# Patient Record
Sex: FEMALE | Race: WHITE
[De-identification: names, ages, dates, MRNs, and addresses within clinical notes are randomized per-mention and may not be internally consistent; named-entity substitution may affect disease eponyms.]

---

## 2017-08-13 ENCOUNTER — HOSPITAL ENCOUNTER (INPATIENT)
Dept: HOSPITAL 23 - P1S | Age: 27
LOS: 5 days | Discharge: HOME | DRG: 885 | End: 2017-08-18
Attending: SPECIALIST | Admitting: SPECIALIST
Payer: COMMERCIAL

## 2017-08-13 VITALS — WEIGHT: 178 LBS | BODY MASS INDEX: 31.54 KG/M2 | HEIGHT: 63 IN

## 2017-08-13 DIAGNOSIS — F15.20: ICD-10-CM

## 2017-08-13 DIAGNOSIS — F11.20: ICD-10-CM

## 2017-08-13 DIAGNOSIS — Z88.0: ICD-10-CM

## 2017-08-13 DIAGNOSIS — Z88.2: ICD-10-CM

## 2017-08-13 DIAGNOSIS — F13.20: ICD-10-CM

## 2017-08-13 DIAGNOSIS — R45.851: ICD-10-CM

## 2017-08-13 DIAGNOSIS — F17.210: ICD-10-CM

## 2017-08-13 DIAGNOSIS — F33.1: Primary | ICD-10-CM

## 2017-08-14 LAB
BARBITURATES UR QL SCN: 0.6 MG/DL
BARBITURATES UR QL SCN: 4.1 G/DL
BASOPHIL#: 0 X10E3
BASOPHIL%: 0.3 %
BENZODIAZ UR QL SCN: 10 U/L
BENZODIAZ UR QL SCN: 13 U/L
BLOOD UREA NITROGEN: 15 MG/DL
BUN/CREATININE RATIO: 21.42
BZE UR QL SCN: 42 U/L
CALCIUM SERUM: 9.3 MG/DL
CK MB SERPL-RTO: 12.8 %
CK MB SERPL-RTO: 33.9 G/DL
CREATININE SERUM: 0.7 MG/DL
DIFF IND: YES
EOSINOPHIL#: 0.9 X10E3
EOSINOPHIL%: 10.6 %
EOSINOPHIL: 7 %
GLOM FILT RATE ESTIMATED: 120.4 ML/MIN
GLUCOSE FASTING: 82 MG/DL
HEMATOCRIT: 41.7 %
HEMOGLOBIN: 14.2 GM/DL
KETONES UR QL: 103 MMOL/L
KETONES UR QL: 28 MMOL/L
LYMPHOCYTE#: 4.4 X10E3
LYMPHOCYTE%: 50.2 %
LYMPHOCYTE: 50 %
MEAN CELL VOLUME: 89.2 FL
MEAN CORPUSCULAR HEMOGLOBIN: 30.3 PG
MEAN PLATELET VOLUME: 7.5 FL
MONOCYTE#: 0.7 X10E3
MONOCYTE%: 7.5 %
MONOCYTE: 8 %
NEUTROPHIL#: 2.7 X10E3
NEUTROPHIL%: 31.4 %
NEUTROPHIL: 35 %
PLATELET COUNT: 317 X10E3
PLATELET ESTIMATE: NORMAL
POTASSIUM: 4.1 MMOL/L
PROTEIN TOTAL SERUM: 6.8 G/DL
RED BLOOD COUNT: 4.68 X10E
SODIUM: 140 MMOL/L
WHITE BLOOD COUNT: 8.8 X10E3

## 2024-03-13 ENCOUNTER — TELEPHONE (OUTPATIENT)
Dept: SPORTS MEDICINE | Facility: CLINIC | Age: 34
End: 2024-03-13

## 2024-03-13 ENCOUNTER — OFFICE VISIT (OUTPATIENT)
Age: 34
End: 2024-03-13
Payer: COMMERCIAL

## 2024-03-13 VITALS — WEIGHT: 201.1 LBS | TEMPERATURE: 97.7 F | BODY MASS INDEX: 34.33 KG/M2 | HEIGHT: 64 IN

## 2024-03-13 DIAGNOSIS — R52 PAIN: Primary | ICD-10-CM

## 2024-03-13 DIAGNOSIS — S62.326B: ICD-10-CM

## 2024-03-13 RX ORDER — LAMOTRIGINE 100 MG/1
100 TABLET ORAL NIGHTLY
COMMUNITY
Start: 2024-03-06

## 2024-03-13 RX ORDER — CLINDAMYCIN HYDROCHLORIDE 300 MG/1
300 CAPSULE ORAL 4 TIMES DAILY
Qty: 12 CAPSULE | Refills: 0 | Status: SHIPPED | OUTPATIENT
Start: 2024-03-13

## 2024-03-13 RX ORDER — FLUTICASONE PROPIONATE AND SALMETEROL XINAFOATE 115; 21 UG/1; UG/1
AEROSOL, METERED RESPIRATORY (INHALATION)
COMMUNITY

## 2024-03-13 RX ORDER — BUSPIRONE HYDROCHLORIDE 10 MG/1
10 TABLET ORAL 2 TIMES DAILY
COMMUNITY

## 2024-03-13 RX ORDER — BUPROPION HYDROCHLORIDE 300 MG/1
300 TABLET ORAL EVERY MORNING
COMMUNITY
Start: 2024-02-12

## 2024-03-13 RX ORDER — HYDROCODONE BITARTRATE AND ACETAMINOPHEN 5; 325 MG/1; MG/1
1 TABLET ORAL
COMMUNITY
Start: 2024-03-11

## 2024-03-13 RX ORDER — MONTELUKAST SODIUM 10 MG/1
10 TABLET ORAL
COMMUNITY
Start: 2024-03-02

## 2024-03-13 RX ORDER — HYDROXYZINE HYDROCHLORIDE 25 MG/1
TABLET, FILM COATED ORAL
COMMUNITY

## 2024-03-13 RX ORDER — FAMOTIDINE 20 MG/1
20 TABLET, FILM COATED ORAL 2 TIMES DAILY
COMMUNITY

## 2024-03-13 RX ORDER — CETIRIZINE HYDROCHLORIDE 10 MG/1
10 TABLET ORAL 2 TIMES DAILY
COMMUNITY
Start: 2024-02-07

## 2024-03-13 NOTE — PROGRESS NOTES
Chief Complaint   Patient presents with    Right Hand - Pain, Initial Evaluation       History of Present Illness  Chayito is a 33 y.o. year old RHD female here today for right hand pain that has been present since 3/10/24 after she punched a steel table at work. She was seen at [facility] where x-rays showed a fracture of the 5th metacarpal. She was placed in a splint and instructed to follow-up with ortho.  Pain has improved since initial injury. She denies pain at rest, and states that she only has pain with movement.  Pain is located on the ulnar aspect of her hand, but will shoot up her forearm with movement.  Admits to associated swelling of the 4th and 5th fingers  Also reports numbness of the pinky  Pain worsens with any sort of movement.   Has been managing symptoms with rest, elevation, and hydrocodone which she only takes at night because it knocks her out. Has otherwise been using ibuprofen as needed.   Denies any previous injury or occurrence.     Works at [employer].      The following data was reviewed by: Sophie Jones DO on 03/13/2024:  Data reviewed :        XR hand 3+ views right from 3/11/24    Anatomical Region Laterality Modality   Upper Extremities right Radiographic Imaging   Hand -- --     Narrative    INDICATION:  Right fifth digit pain post punching steel plate.    TECHNIQUE:  Three views of the right hand.    COMPARISON:  None Available.    FINDINGS:    There is an oblique angulated fracture of the mid and distal shaft of the right  fifth metacarpal.  Overlying soft tissue swelling.  No underlying lytic or blastic  lesion.  No dislocation.  The fracture does not appear intra-articular.  The  fingers are held in mild flexion.  Allowing for this limitation, there is no  fracture of the phalanges.    IMPRESSION:  Oblique angulated fracture of the mid and distal shaft, right fifth metacarpal.    Signed by William Patrick MD  ##### Final #####      Lab Results   Component Value Date    GLUCOSE 85  "06/10/2017    BUN 11 07/20/2021    CREATININE 0.57 07/20/2021    EGFR >60 09/23/2014    BCR 19.3 07/20/2021    K 4.4 07/20/2021    CO2 25 07/20/2021    CALCIUM 9.4 07/20/2021    ALBUMIN 3.8 07/20/2021    BILITOT 0.3 07/20/2021    AST 20 07/20/2021    ALT 14 07/20/2021         Temp 97.7 °F (36.5 °C)   Ht 162.6 cm (64\")   Wt 91.2 kg (201 lb 1.6 oz)   BMI 34.52 kg/m²        Physical Exam  Vital signs reviewed.   General: Well developed, well nourished; No acute distress.  Eyes: conjunctiva clear; pupils equally round and reactive  ENT: external ears and nose atraumatic; hearing normal  CV: no peripheral edema, 2+ distal pulses  Resp: normal respiratory effort, no use of accessory muscles  Skin: normal color and pigmentation; no rashes or wounds; normal turgor  Psych: alert and oriented; mood and affect appropriate; recent and remote memory intact    MSK Exam:  The right hand is diffusely swollen throughout, worse along the medial aspect, but with swelling that continues to all digits. There is associated ecchymosis over the 5th metacarpal. There is what appears to be a pinpoint break in the skin on the dorsum of the hand that correlates with the site of the fracture. The pinpoint break in the skin is covered with a scab, and is clean and dry with no drainage and no surrounding erythema. There are obvious signs of acute bony deformity with loss of prominence of the 5th MCP joint and subsequent slight volar displacement of the digit. There is decreased sensation of the pinky finger. Digit is warm and well perfused and there is normal cap refill. ROM and strength testing deferred given the presence of a known and significant fracture. There is mild tenderness at the 4th metacarpal but no focal area of significant tenderness. The opposite arm is normal.    Right Hand X-Ray  Indication: Pain  AP, Lateral, and Oblique views  Findings:  Oblique and angulated fracture at the mid to distal shaft of the 5th metacarpal with " possible comminution and extension proximally  There is significant volar angulation which has resulted in a thin, sharp end to the proximal fragment that appears to be very superficial to the skin surface  No bony lesion  Soft tissue swelling  No prior images were available for direct comparison.      Assessment and Plan  Diagnoses and all orders for this visit:    1. Pain (Primary)  -     XR Hand 3+ View Right    2. Displaced fracture of shaft of fifth metacarpal bone, right hand, initial encounter for open fracture  -     Ambulatory Referral to Hand Surgery  -     clindamycin (CLEOCIN) 300 MG capsule; Take 1 capsule by mouth 4 (Four) Times a Day.  Dispense: 12 capsule; Refill: 0    Chayito is a 33 y.o. year old RHD female here today for right hand pain that has been present since 3/10/24 after she punched a steel table at work. We reviewed images and exam findings. X-rays show an oblique fracture at the mid to distal shaft of the 5th metacarpal with possible comminution. The fracture and significant volar angulation of the distal segment has resulted in a thin, sharp end to the proximal segment that appears to be very superficial to the skin surface. Furthermore, there is a pinpoint wound on the dorsum of the hand that is concerning for an open fracture. I recommended that she be seen by a hand surgeon and an urgent referral was placed. She was placed on a short course of oral antibiotics to help prevent infection. (Unfortunately, she has an allergy to penicillin so she was placed on clindamycin.) She was placed in a new ulnar gutter splint with strict instructions that it should be worn continuously until she is seen by the surgeon. She may continue with use of the previously prescribed Norco as needed. May also use OTC NSAIDs and/or Tylenol as needed. (We reviewed proper dosing recommendations, including caution not exceed recommended daily dose of acetaminophen as that is also in her Norco.) I recommended  continued elevation above the level of her heart. We will follow-up as needed. All of her questions were answered and she is agreeable with the plan.    Dictated utilizing Dragon dictation.

## 2024-03-13 NOTE — TELEPHONE ENCOUNTER
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

## 2024-03-13 NOTE — LETTER
March 13, 2024     Patient: Chayito Rose   YOB: 1990   Date of Visit: 3/13/2024       To Whom It May Concern:    It is my medical opinion that Chayito Rose may return to light duty on 3/14/24 with the following restrictions: she should avoid use of the right upper extremity. 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 She will follow-up with a specialist in the upcoming week and updated recommendations will be made at that time.           Sincerely,        Sophie Jones DO    CC: No Recipients

## 2024-03-15 ENCOUNTER — TRANSCRIBE ORDERS (OUTPATIENT)
Dept: SURGERY | Facility: SURGERY CENTER | Age: 34
End: 2024-03-15
Payer: COMMERCIAL

## 2024-03-15 DIAGNOSIS — Z41.9 SURGERY, ELECTIVE: Primary | ICD-10-CM

## 2024-03-20 ENCOUNTER — HOSPITAL ENCOUNTER (OUTPATIENT)
Facility: SURGERY CENTER | Age: 34
Setting detail: HOSPITAL OUTPATIENT SURGERY
Discharge: HOME | End: 2024-03-20
Attending: STUDENT IN AN ORGANIZED HEALTH CARE EDUCATION/TRAINING PROGRAM | Admitting: STUDENT IN AN ORGANIZED HEALTH CARE EDUCATION/TRAINING PROGRAM
Payer: COMMERCIAL

## 2024-03-20 ENCOUNTER — ANESTHESIA (OUTPATIENT)
Dept: SURGERY | Facility: SURGERY CENTER | Age: 34
End: 2024-03-20
Payer: COMMERCIAL

## 2024-03-20 ENCOUNTER — ANESTHESIA EVENT (OUTPATIENT)
Dept: SURGERY | Facility: SURGERY CENTER | Age: 34
End: 2024-03-20
Payer: COMMERCIAL

## 2024-03-20 VITALS
OXYGEN SATURATION: 98 % | RESPIRATION RATE: 16 BRPM | WEIGHT: 203.4 LBS | BODY MASS INDEX: 34.72 KG/M2 | DIASTOLIC BLOOD PRESSURE: 68 MMHG | HEIGHT: 64 IN | HEART RATE: 80 BPM | TEMPERATURE: 98.4 F | SYSTOLIC BLOOD PRESSURE: 120 MMHG

## 2024-03-20 LAB
B-HCG UR QL: NEGATIVE
EXPIRATION DATE: NORMAL
INTERNAL NEGATIVE CONTROL: NEGATIVE
INTERNAL POSITIVE CONTROL: POSITIVE
Lab: NORMAL

## 2024-03-20 PROCEDURE — 25010000002 PROPOFOL 1000 MG/100ML EMULSION: Performed by: ANESTHESIOLOGY

## 2024-03-20 PROCEDURE — 81025 URINE PREGNANCY TEST: CPT | Performed by: STUDENT IN AN ORGANIZED HEALTH CARE EDUCATION/TRAINING PROGRAM

## 2024-03-20 PROCEDURE — 26615 TREAT METACARPAL FRACTURE: CPT | Performed by: STUDENT IN AN ORGANIZED HEALTH CARE EDUCATION/TRAINING PROGRAM

## 2024-03-20 PROCEDURE — 25010000002 DEXAMETHASONE PER 1 MG: Performed by: ANESTHESIOLOGY

## 2024-03-20 PROCEDURE — 25010000002 LIDOCAINE 1 % SOLUTION: Performed by: ANESTHESIOLOGY

## 2024-03-20 PROCEDURE — 25010000002 CLINDAMYCIN 300 MG/50ML SOLUTION: Performed by: ANESTHESIOLOGY

## 2024-03-20 PROCEDURE — 25010000002 HYDROMORPHONE 1 MG/ML SOLUTION: Performed by: ANESTHESIOLOGY

## 2024-03-20 PROCEDURE — 25810000003 LACTATED RINGERS PER 1000 ML: Performed by: ANESTHESIOLOGY

## 2024-03-20 PROCEDURE — 25010000002 MIDAZOLAM PER 1 MG: Performed by: ANESTHESIOLOGY

## 2024-03-20 PROCEDURE — 25010000002 FENTANYL CITRATE (PF) 50 MCG/ML SOLUTION: Performed by: ANESTHESIOLOGY

## 2024-03-20 PROCEDURE — 25010000002 DROPERIDOL PER 5 MG: Performed by: ANESTHESIOLOGY

## 2024-03-20 RX ORDER — MAGNESIUM HYDROXIDE 1200 MG/15ML
LIQUID ORAL AS NEEDED
Status: DISCONTINUED | OUTPATIENT
Start: 2024-03-20 | End: 2024-03-20 | Stop reason: HOSPADM

## 2024-03-20 RX ORDER — PROMETHAZINE HYDROCHLORIDE 12.5 MG/1
25 TABLET ORAL ONCE AS NEEDED
Status: DISCONTINUED | OUTPATIENT
Start: 2024-03-20 | End: 2024-03-20 | Stop reason: HOSPADM

## 2024-03-20 RX ORDER — FLUMAZENIL 0.1 MG/ML
0.2 INJECTION INTRAVENOUS AS NEEDED
Status: DISCONTINUED | OUTPATIENT
Start: 2024-03-20 | End: 2024-03-20 | Stop reason: HOSPADM

## 2024-03-20 RX ORDER — PROPOFOL 10 MG/ML
INJECTION, EMULSION INTRAVENOUS AS NEEDED
Status: DISCONTINUED | OUTPATIENT
Start: 2024-03-20 | End: 2024-03-20 | Stop reason: SURG

## 2024-03-20 RX ORDER — DROPERIDOL 2.5 MG/ML
0.62 INJECTION, SOLUTION INTRAMUSCULAR; INTRAVENOUS
Status: DISCONTINUED | OUTPATIENT
Start: 2024-03-20 | End: 2024-03-20 | Stop reason: HOSPADM

## 2024-03-20 RX ORDER — FENTANYL CITRATE 50 UG/ML
50 INJECTION, SOLUTION INTRAMUSCULAR; INTRAVENOUS ONCE AS NEEDED
Status: DISCONTINUED | OUTPATIENT
Start: 2024-03-20 | End: 2024-03-20 | Stop reason: HOSPADM

## 2024-03-20 RX ORDER — CLINDAMYCIN PHOSPHATE 900 MG/50ML
900 INJECTION, SOLUTION INTRAVENOUS ONCE
Status: DISCONTINUED | OUTPATIENT
Start: 2024-03-20 | End: 2024-03-20 | Stop reason: HOSPADM

## 2024-03-20 RX ORDER — SODIUM CHLORIDE 0.9 % (FLUSH) 0.9 %
3 SYRINGE (ML) INJECTION EVERY 12 HOURS SCHEDULED
Status: DISCONTINUED | OUTPATIENT
Start: 2024-03-20 | End: 2024-03-20 | Stop reason: HOSPADM

## 2024-03-20 RX ORDER — FAMOTIDINE 10 MG/ML
20 INJECTION, SOLUTION INTRAVENOUS ONCE
Status: COMPLETED | OUTPATIENT
Start: 2024-03-20 | End: 2024-03-20

## 2024-03-20 RX ORDER — NALOXONE HCL 0.4 MG/ML
0.2 VIAL (ML) INJECTION AS NEEDED
Status: DISCONTINUED | OUTPATIENT
Start: 2024-03-20 | End: 2024-03-20 | Stop reason: HOSPADM

## 2024-03-20 RX ORDER — MIDAZOLAM HYDROCHLORIDE 1 MG/ML
1 INJECTION INTRAMUSCULAR; INTRAVENOUS
Status: DISCONTINUED | OUTPATIENT
Start: 2024-03-20 | End: 2024-03-20 | Stop reason: HOSPADM

## 2024-03-20 RX ORDER — FENTANYL CITRATE 0.05 MG/ML
INJECTION, SOLUTION INTRAMUSCULAR; INTRAVENOUS AS NEEDED
Status: DISCONTINUED | OUTPATIENT
Start: 2024-03-20 | End: 2024-03-20 | Stop reason: SURG

## 2024-03-20 RX ORDER — NALOXONE HYDROCHLORIDE 4 MG/.1ML
SPRAY NASAL
Qty: 2 EACH | Refills: 0 | Status: SHIPPED | OUTPATIENT
Start: 2024-03-20

## 2024-03-20 RX ORDER — CLINDAMYCIN PHOSPHATE 300 MG/50ML
INJECTION, SOLUTION INTRAVENOUS AS NEEDED
Status: DISCONTINUED | OUTPATIENT
Start: 2024-03-20 | End: 2024-03-20 | Stop reason: SURG

## 2024-03-20 RX ORDER — ONDANSETRON 2 MG/ML
4 INJECTION INTRAMUSCULAR; INTRAVENOUS ONCE AS NEEDED
Status: DISCONTINUED | OUTPATIENT
Start: 2024-03-20 | End: 2024-03-20 | Stop reason: HOSPADM

## 2024-03-20 RX ORDER — FENTANYL CITRATE 50 UG/ML
50 INJECTION, SOLUTION INTRAMUSCULAR; INTRAVENOUS
Status: DISCONTINUED | OUTPATIENT
Start: 2024-03-20 | End: 2024-03-20 | Stop reason: HOSPADM

## 2024-03-20 RX ORDER — BUPIVACAINE HYDROCHLORIDE AND EPINEPHRINE 2.5; 5 MG/ML; UG/ML
INJECTION, SOLUTION EPIDURAL; INFILTRATION; INTRACAUDAL; PERINEURAL AS NEEDED
Status: DISCONTINUED | OUTPATIENT
Start: 2024-03-20 | End: 2024-03-20 | Stop reason: HOSPADM

## 2024-03-20 RX ORDER — HYDROCODONE BITARTRATE AND ACETAMINOPHEN 5; 325 MG/1; MG/1
1 TABLET ORAL EVERY 6 HOURS PRN
Qty: 5 TABLET | Refills: 0 | Status: SHIPPED | OUTPATIENT
Start: 2024-03-20

## 2024-03-20 RX ORDER — SODIUM CHLORIDE, SODIUM LACTATE, POTASSIUM CHLORIDE, CALCIUM CHLORIDE 600; 310; 30; 20 MG/100ML; MG/100ML; MG/100ML; MG/100ML
9 INJECTION, SOLUTION INTRAVENOUS CONTINUOUS
Status: DISCONTINUED | OUTPATIENT
Start: 2024-03-20 | End: 2024-03-20 | Stop reason: HOSPADM

## 2024-03-20 RX ORDER — HYDROCODONE BITARTRATE AND ACETAMINOPHEN 5; 325 MG/1; MG/1
1 TABLET ORAL EVERY 6 HOURS PRN
Status: DISCONTINUED | OUTPATIENT
Start: 2024-03-20 | End: 2024-03-20 | Stop reason: HOSPADM

## 2024-03-20 RX ORDER — PROMETHAZINE HYDROCHLORIDE 25 MG/1
25 SUPPOSITORY RECTAL ONCE AS NEEDED
Status: DISCONTINUED | OUTPATIENT
Start: 2024-03-20 | End: 2024-03-20 | Stop reason: HOSPADM

## 2024-03-20 RX ORDER — SODIUM CHLORIDE 0.9 % (FLUSH) 0.9 %
3-10 SYRINGE (ML) INJECTION AS NEEDED
Status: DISCONTINUED | OUTPATIENT
Start: 2024-03-20 | End: 2024-03-20 | Stop reason: HOSPADM

## 2024-03-20 RX ORDER — DIPHENHYDRAMINE HYDROCHLORIDE 50 MG/ML
12.5 INJECTION INTRAMUSCULAR; INTRAVENOUS
Status: DISCONTINUED | OUTPATIENT
Start: 2024-03-20 | End: 2024-03-20 | Stop reason: HOSPADM

## 2024-03-20 RX ORDER — LIDOCAINE HYDROCHLORIDE 10 MG/ML
INJECTION, SOLUTION INFILTRATION; PERINEURAL AS NEEDED
Status: DISCONTINUED | OUTPATIENT
Start: 2024-03-20 | End: 2024-03-20 | Stop reason: SURG

## 2024-03-20 RX ORDER — DEXAMETHASONE SODIUM PHOSPHATE 4 MG/ML
INJECTION, SOLUTION INTRA-ARTICULAR; INTRALESIONAL; INTRAMUSCULAR; INTRAVENOUS; SOFT TISSUE AS NEEDED
Status: DISCONTINUED | OUTPATIENT
Start: 2024-03-20 | End: 2024-03-20 | Stop reason: SURG

## 2024-03-20 RX ADMIN — DROPERIDOL 1.25 MG: 2.5 INJECTION, SOLUTION INTRAMUSCULAR; INTRAVENOUS at 10:36

## 2024-03-20 RX ADMIN — SODIUM CHLORIDE, POTASSIUM CHLORIDE, SODIUM LACTATE AND CALCIUM CHLORIDE 9 ML/HR: 600; 310; 30; 20 INJECTION, SOLUTION INTRAVENOUS at 08:53

## 2024-03-20 RX ADMIN — FAMOTIDINE 20 MG: 10 INJECTION, SOLUTION INTRAVENOUS at 08:53

## 2024-03-20 RX ADMIN — DEXAMETHASONE SODIUM PHOSPHATE 8 MG: 4 INJECTION, SOLUTION INTRAMUSCULAR; INTRAVENOUS at 10:42

## 2024-03-20 RX ADMIN — PROPOFOL 50 MG: 10 INJECTION, EMULSION INTRAVENOUS at 10:39

## 2024-03-20 RX ADMIN — LIDOCAINE HYDROCHLORIDE 60 MG: 10 INJECTION, SOLUTION INFILTRATION; PERINEURAL at 10:38

## 2024-03-20 RX ADMIN — FENTANYL CITRATE 50 MCG: 0.05 INJECTION, SOLUTION INTRAMUSCULAR; INTRAVENOUS at 10:36

## 2024-03-20 RX ADMIN — PROPOFOL 150 MG: 10 INJECTION, EMULSION INTRAVENOUS at 10:38

## 2024-03-20 RX ADMIN — MIDAZOLAM 1 MG: 1 INJECTION INTRAMUSCULAR; INTRAVENOUS at 08:54

## 2024-03-20 RX ADMIN — PROPOFOL 50 MG: 10 INJECTION, EMULSION INTRAVENOUS at 10:40

## 2024-03-20 RX ADMIN — CLINDAMYCIN PHOSPHATE 900 MG: 300 INJECTION, SOLUTION INTRAVENOUS at 10:45

## 2024-03-20 RX ADMIN — HYDROCODONE BITARTRATE AND ACETAMINOPHEN 1 TABLET: 5; 325 TABLET ORAL at 11:40

## 2024-03-20 RX ADMIN — HYDROMORPHONE HYDROCHLORIDE 0.5 MG: 1 INJECTION, SOLUTION INTRAMUSCULAR; INTRAVENOUS; SUBCUTANEOUS at 10:58

## 2024-03-20 NOTE — ANESTHESIA PREPROCEDURE EVALUATION
Anesthesia Evaluation     Patient summary reviewed   no history of anesthetic complications:   NPO Solid Status: > 8 hours  NPO Liquid Status: > 2 hours           Airway   Mallampati: II  TM distance: >3 FB  Neck ROM: full  No difficulty expected  Dental      Pulmonary     breath sounds clear to auscultation  (+) a smoker Former,  (-) shortness of breath, recent URI  Cardiovascular   Exercise tolerance: good (4-7 METS)    Rhythm: regular  Rate: normal    (-) past MI, dysrhythmias, angina      Neuro/Psych  (+) headaches, psychiatric history Anxiety and Depression  (-) seizures, CVA  GI/Hepatic/Renal/Endo    (+) obesity, GERDDiabetes: HbA1c 5.5.    Musculoskeletal     (+) back pain, chronic pain, neck pain  (-) neck stiffness  Abdominal    Substance History   (+) drug use (marijuana. hx of cocaine, benzo  reports none x4yrs)     OB/GYN          Other                          Anesthesia Plan    ASA 2     general     intravenous induction     Anesthetic plan, risks, benefits, and alternatives have been provided, discussed and informed consent has been obtained with: patient.    Use of blood products discussed with patient .        CODE STATUS:

## 2024-03-20 NOTE — OP NOTE
MRN: [id number]  : 1990 AGE: 33 y.o. GENDER: female  DATE OF OPERATION: 3/20/2024  PREOPERATIVE DIAGNOSIS:   Right small finger metacarpal fracture  POSTOPERATIVE DIAGNOSIS:   Right small finger metacarpal fracture  OPERATION PERFORMED:   Right small finger metacarpal fracture open reduction internal fixation (CPT 24386)  SURGEON: Nela Wood MD    ANESTHESIA: General  ASSISTANT: None  ESTIMATED BLOOD LOSS: Minimal  SPONGE AND NEEDLE COUNT: Correct  INDICATIONS: This patient is noted to have a Right small finger metacarpal fracture with rotational malalignment and apex dorsal deformity. The risks of surgery were discussed and included Pain, Bleeding, Infection, Complications of anesthesia, Damage to blood vessels, nerves and other surrounding structures, Stiffness, Scar, Further procedures, Nonunion or malunion, Hardware Failure, and Unforeseen risks of surgery including stroke, heart stoppage or death. No guarantees were made. Following a thorough discussion, questions were solicited and answered to her satisfaction. Verbal and written consent were obtained prior to proceeding with surgery.  Discussed with her that we could do nonoperative or operative intervention.  Operative intervention I would leave pretty a back to work with no restrictions and no brace.  She wished to proceed.    COMPONENTS:   MedArtis 3.0 mm screws x 1, 40 mm in length    SPECIMENS:   None     PERTINENT FINDINGS:   Anatomic fixation of metacarpal fracture      DETAILS OF PROCEDURE:  The patient was met in the preoperative area. The site was marked. The consent and H&P were reviewed. The patient was then transferred to the operative suite and placed supine on the operative table. The patient submitted to anesthesia. Surgical alcohol was used to thoroughly clean the entire operative extremity. The arm was then prepped in the normal sterile fashion, which included Chloroprep and multiple layers of sterile drapes.     A surgical  timeout was taken to verify the patient's identity, the surgical side/site, planned procedure and the administration of preoperative antibiotics. Bipolar cautery and 3.5-power loupe magnification were used.     Under fluoroscopy I was able to confirm that I could close reduce the fracture.  I held up a 3.0 mm med artist screw which deemed to be the appropriate thickness for the isthmus of the metacarpal.  I then percutaneously placed a K wire for the 3-0 millimeters med artist screw which was placed in appropriate position both on the AP and lateral applying volar pressure on the distal metacarpal head to reduce the fracture.  The wire was measured and a 40 mm screw was deemed to be appropriate.  I then overdrilled for a 40 mm 3-0 millimeter screw.    An opening metaphyseal  was used to allow for the thicker distal threads.  I then inserted the screw under fluoroscopy and had good cortical fit and bite throughout the isthmus.  Final x-rays were taken.  The overall length alignment and rotation was anatomic.  The stab incision was irrigated out Steri-Strip was applied and a Band-Aid was applied for dressing.    I injected 10 cc of Marcaine half percent with epinephrine for postoperative pain control.    Anesthesia was reversed without complication, the patient was transferred to the [facility] and taken to the recovery room in stable condition. Sponge and needle count were reported to me as correct. There were no complications. The patient tolerated the procedure well.    Post Operative Plan:   Weightbearing as tolerated on the right upper extremity.  No restrictions.  She is okay to take the Band-Aid off and continue to change it if it gets dirty.  She should see me back in 10 to 14 days for her first postop appointment.  Questions encouraged and answered no guarantees given

## 2024-03-20 NOTE — ANESTHESIA POSTPROCEDURE EVALUATION
"Patient: Chayito Rose    Procedure Summary       Date: 03/20/24 Room / Location: [facility] / [facility]    Anesthesia Start: 1035 Anesthesia Stop: 1116    Procedure: OPEN REDUCTION INTERNAL FIXATION RIGHT FIFTH METACARPAL FRACTURE (Right: Wrist) Diagnosis:       Closed nondisplaced fracture of shaft of fifth metacarpal bone of right hand      (Closed nondisplaced fracture of shaft of fifth metacarpal bone of right hand [S62.356A])    Surgeons: Nela Wood MD Provider: Farhat Styles DO    Anesthesia Type: general ASA Status: 2            Anesthesia Type: general    Vitals  Vitals Value Taken Time   /81 03/20/24 1124   Temp 36.7 °C (98 °F) 03/20/24 1113   Pulse 88 03/20/24 1124   Resp 16 03/20/24 1124   SpO2 97 % 03/20/24 1124           Post Anesthesia Care and Evaluation    Patient location during evaluation: bedside  Patient participation: complete - patient participated  Level of consciousness: awake and alert  Pain management: adequate    Airway patency: patent  Anesthetic complications: No anesthetic complications  PONV Status: controlled  Cardiovascular status: acceptable and hemodynamically stable  Respiratory status: acceptable, spontaneous ventilation and nonlabored ventilation  Hydration status: acceptable    Comments: /82 (BP Location: Left arm, Patient Position: Lying)   Pulse 86   Temp 36.7 °C (98 °F) (Temporal)   Resp 16   Ht 162.6 cm (64\")   Wt 92.3 kg (203 lb 6.4 oz)   LMP 02/25/2024 Comment: uhcg neg  SpO2 98%   BMI 34.91 kg/m²       "